# Patient Record
Sex: MALE | Race: WHITE | Employment: FULL TIME | URBAN - METROPOLITAN AREA
[De-identification: names, ages, dates, MRNs, and addresses within clinical notes are randomized per-mention and may not be internally consistent; named-entity substitution may affect disease eponyms.]

---

## 2023-06-05 ENCOUNTER — OFFICE VISIT (OUTPATIENT)
Dept: UROLOGY | Age: 58
End: 2023-06-05
Payer: OTHER GOVERNMENT

## 2023-06-05 DIAGNOSIS — E29.1 HYPOGONADISM IN MALE: ICD-10-CM

## 2023-06-05 DIAGNOSIS — N52.01 ERECTILE DYSFUNCTION DUE TO ARTERIAL INSUFFICIENCY: Primary | ICD-10-CM

## 2023-06-05 PROCEDURE — 99214 OFFICE O/P EST MOD 30 MIN: CPT | Performed by: UROLOGY

## 2023-06-05 RX ORDER — LISINOPRIL 20 MG/1
20 TABLET ORAL DAILY
COMMUNITY

## 2023-06-05 RX ORDER — SIMVASTATIN 20 MG
20 TABLET ORAL NIGHTLY
COMMUNITY

## 2023-06-05 RX ORDER — LEVOTHYROXINE SODIUM 0.2 MG/1
200 TABLET ORAL DAILY
COMMUNITY

## 2023-06-05 RX ORDER — CHLORAL HYDRATE 500 MG
CAPSULE ORAL DAILY
COMMUNITY

## 2023-06-05 RX ORDER — INSULIN GLARGINE 100 [IU]/ML
INJECTION, SOLUTION SUBCUTANEOUS
COMMUNITY

## 2023-06-05 RX ORDER — INSULIN ASPART 100 [IU]/ML
INJECTION, SOLUTION INTRAVENOUS; SUBCUTANEOUS
COMMUNITY

## 2023-06-05 ASSESSMENT — ENCOUNTER SYMPTOMS
SKIN LESIONS: 0
CONSTIPATION: 0
INDIGESTION: 0
BACK PAIN: 0
HEARTBURN: 0
BLOOD IN STOOL: 0
EYE DISCHARGE: 0
VOMITING: 0
WHEEZING: 0
SHORTNESS OF BREATH: 0
ABDOMINAL PAIN: 0
DIARRHEA: 0
EYE PAIN: 0
COUGH: 0
NAUSEA: 0

## 2023-06-05 NOTE — PROGRESS NOTES
[facility]  [address] [facility] [address], [address] [facility]  [address] [place] [address]  : 1965    Chief Complaint   Patient presents with    Follow-up          HPI     Shea Giordano is a 66 y.o. male with a PMH of ED (failed viagra / cialis / MARGARET) who returns for follow up today for low T concerns. Seen 2022 by me and tried cialis + MARGARET for ED. States he has had only mild success. Main concern today is weight gain, fatigue, low libido and concern for low T. Has not had recent T levels checked. Wants to discuss TRT today. PCP screens for CAP at [address] and normal per patient. Past Medical History:   Diagnosis Date    Diabetes ([address]     Hypercholesterolemia     Hypertension     Thyroid disease      History reviewed. No pertinent surgical history. Current Outpatient Medications   Medication Sig Dispense Refill    insulin aspart (NOVOLOG) 100 UNIT/ML injection vial by Pump fill route      insulin glargine (LANTUS) 100 UNIT/ML injection vial Inject into the skin      metFORMIN (GLUCOPHAGE) 1000 MG tablet Take by mouth 2 times daily (with meals)      lisinopril (PRINIVIL;ZESTRIL) 20 MG tablet Take 1 tablet by mouth daily      simvastatin (ZOCOR) 20 MG tablet Take 1 tablet by mouth nightly      Omega-3 Fatty Acids (FISH OIL) 1000 MG capsule Take by mouth daily      levothyroxine (SYNTHROID) 200 MCG tablet Take 1 tablet by mouth Daily      Cetirizine HCl 10 MG CAPS Take by mouth       No current facility-administered medications for this visit.      No Known Allergies  Social History     Socioeconomic History    Marital status:      Spouse name: Not on file    Number of children: Not on file    Years of education: Not on file    Highest education level: Not on file   Occupational History    Not on file   Tobacco Use    Smoking status: Former    Smokeless tobacco: Former   Substance and Sexual Activity    Alcohol use: Never    Drug use: Not on file    Sexual activity: Not on

## 2023-06-11 DIAGNOSIS — E29.1 HYPOGONADISM IN MALE: Primary | ICD-10-CM

## 2023-06-11 DIAGNOSIS — N40.0 BENIGN PROSTATIC HYPERPLASIA WITHOUT LOWER URINARY TRACT SYMPTOMS: ICD-10-CM

## 2023-06-21 ENCOUNTER — NURSE ONLY (OUTPATIENT)
Dept: UROLOGY | Age: 58
End: 2023-06-21

## 2023-06-21 DIAGNOSIS — E29.1 HYPOGONADISM IN MALE: ICD-10-CM

## 2023-06-21 DIAGNOSIS — N40.0 BENIGN PROSTATIC HYPERPLASIA WITHOUT LOWER URINARY TRACT SYMPTOMS: ICD-10-CM

## 2023-06-21 LAB
ALBUMIN SERPL-MCNC: 3.7 G/DL (ref 3.5–5)
ALBUMIN/GLOB SERPL: 1.2 (ref 0.4–1.6)
ALP SERPL-CCNC: 74 U/L (ref 50–136)
ALT SERPL-CCNC: 28 U/L (ref 12–65)
AST SERPL-CCNC: 12 U/L (ref 15–37)
BILIRUB DIRECT SERPL-MCNC: 0.2 MG/DL
BILIRUB SERPL-MCNC: 0.5 MG/DL (ref 0.2–1.1)
ERYTHROCYTE [DISTWIDTH] IN BLOOD BY AUTOMATED COUNT: 12.4 % (ref 11.9–14.6)
GLOBULIN SER CALC-MCNC: 3 G/DL (ref 2.8–4.5)
HCT VFR BLD AUTO: 42 % (ref 41.1–50.3)
HGB BLD-MCNC: 14.6 G/DL (ref 13.6–17.2)
MCH RBC QN AUTO: 31.9 PG (ref 26.1–32.9)
MCHC RBC AUTO-ENTMCNC: 34.8 G/DL (ref 31.4–35)
MCV RBC AUTO: 91.9 FL (ref 82–102)
NRBC # BLD: 0 K/UL (ref 0–0.2)
PLATELET # BLD AUTO: 281 K/UL (ref 150–450)
PMV BLD AUTO: 9.1 FL (ref 9.4–12.3)
PROT SERPL-MCNC: 6.7 G/DL (ref 6.3–8.2)
PSA SERPL-MCNC: 0.2 NG/ML
RBC # BLD AUTO: 4.57 M/UL (ref 4.23–5.6)
WBC # BLD AUTO: 8.3 K/UL (ref 4.3–11.1)

## 2023-06-24 LAB — TESTOST SERPL-MCNC: 290 NG/DL (ref 264–916)

## 2023-06-26 LAB — TESTOST FREE SERPL-MCNC: 3.5 PG/ML (ref 7.2–24)

## 2023-06-27 DIAGNOSIS — E29.1 HYPOGONADISM IN MALE: Primary | ICD-10-CM

## 2023-06-27 RX ORDER — TESTOSTERONE 16.2 MG/G
2 GEL TRANSDERMAL DAILY
Qty: 1 EACH | Refills: 5 | Status: SHIPPED | OUTPATIENT
Start: 2023-06-27 | End: 2024-06-26

## 2023-09-29 ENCOUNTER — NURSE ONLY (OUTPATIENT)
Dept: UROLOGY | Age: 58
End: 2023-09-29

## 2023-09-29 DIAGNOSIS — E29.1 HYPOGONADISM IN MALE: ICD-10-CM

## 2023-09-29 LAB
ALBUMIN SERPL-MCNC: 3.5 G/DL (ref 3.5–5)
ALBUMIN/GLOB SERPL: 1.1 (ref 0.4–1.6)
ALP SERPL-CCNC: 88 U/L (ref 50–136)
ALT SERPL-CCNC: 26 U/L (ref 12–65)
AST SERPL-CCNC: 9 U/L (ref 15–37)
BILIRUB DIRECT SERPL-MCNC: 0.1 MG/DL
BILIRUB SERPL-MCNC: 0.5 MG/DL (ref 0.2–1.1)
ERYTHROCYTE [DISTWIDTH] IN BLOOD BY AUTOMATED COUNT: 12.1 % (ref 11.9–14.6)
GLOBULIN SER CALC-MCNC: 3.1 G/DL (ref 2.8–4.5)
HCT VFR BLD AUTO: 41.1 % (ref 41.1–50.3)
HGB BLD-MCNC: 14.4 G/DL (ref 13.6–17.2)
MCH RBC QN AUTO: 31.7 PG (ref 26.1–32.9)
MCHC RBC AUTO-ENTMCNC: 35 G/DL (ref 31.4–35)
MCV RBC AUTO: 90.5 FL (ref 82–102)
NRBC # BLD: 0 K/UL (ref 0–0.2)
PLATELET # BLD AUTO: 257 K/UL (ref 150–450)
PMV BLD AUTO: 9 FL (ref 9.4–12.3)
PROT SERPL-MCNC: 6.6 G/DL (ref 6.3–8.2)
PSA SERPL-MCNC: 0.2 NG/ML
RBC # BLD AUTO: 4.54 M/UL (ref 4.23–5.6)
WBC # BLD AUTO: 8.7 K/UL (ref 4.3–11.1)

## 2023-10-01 LAB — TESTOST SERPL-MCNC: 203 NG/DL (ref 264–916)

## 2023-10-02 NOTE — RESULT ENCOUNTER NOTE
Will review at upcoming appointment. Testosterone level still low. Loy Ramey M.D.     [facility]  [facility], [address] [place] [address]  Phone: [phone]  Fax: [phone]

## 2023-10-06 ENCOUNTER — OFFICE VISIT (OUTPATIENT)
Dept: UROLOGY | Age: 58
End: 2023-10-06
Payer: COMMERCIAL

## 2023-10-06 DIAGNOSIS — E29.1 HYPOGONADISM IN MALE: Primary | ICD-10-CM

## 2023-10-06 DIAGNOSIS — N52.01 ERECTILE DYSFUNCTION DUE TO ARTERIAL INSUFFICIENCY: ICD-10-CM

## 2023-10-06 LAB — TESTOST FREE SERPL-MCNC: 3.9 PG/ML (ref 7.2–24)

## 2023-10-06 PROCEDURE — 99214 OFFICE O/P EST MOD 30 MIN: CPT | Performed by: UROLOGY

## 2023-10-06 ASSESSMENT — ENCOUNTER SYMPTOMS
CONSTIPATION: 0
BLOOD IN STOOL: 0
ABDOMINAL PAIN: 0
INDIGESTION: 0
SHORTNESS OF BREATH: 0
NAUSEA: 0
BACK PAIN: 0
VOMITING: 0
WHEEZING: 0
EYE DISCHARGE: 0
EYE PAIN: 0
DIARRHEA: 0
HEARTBURN: 0
COUGH: 0
SKIN LESIONS: 0

## 2023-10-06 NOTE — PROGRESS NOTES
[facility]  [address]  : 1965    Chief Complaint   Patient presents with    Follow-up          HPI     Joes Blackwell is a 66 y.o. male with a PMH of ED (failed viagra / cialis / MARGARET) and hypogonadism who returns for follow up. Seen 2023 by me and opted to start androgel 2 pumps daily. Today, he reports NO improvement in low T symptoms and has gained weight, fatigue and low libido. Repeat T lab after 3 months of gel shows T level 203. He reports never missing a dose of the medication and wants to discuss other options today. He also has ED and tried cialis + MARGARET for ED in the past. States he has had only mild success. Does not want further treatment for this at the current time. PCP screens for CAP at [place] and normal per patient. Past Medical History:   Diagnosis Date    Diabetes ([address])     Hypercholesterolemia     Hypertension     Thyroid disease      History reviewed. No pertinent surgical history. Current Outpatient Medications   Medication Sig Dispense Refill    Testosterone Cypionate 200 MG/ML KIT Inject 1 mL into the muscle every 10 days. Max Daily Amount: 1 mL 9 kit 5    Testosterone (ANDROGEL) 20.25 MG/ACT (1.62%) GEL gel Place 2 actuation onto the skin daily.  Max Daily Amount: 40.5 mg 1 each 5    insulin glargine (LANTUS) 100 UNIT/ML injection vial Inject into the skin      metFORMIN (GLUCOPHAGE) 1000 MG tablet Take by mouth 2 times daily (with meals)      lisinopril (PRINIVIL;ZESTRIL) 20 MG tablet Take 1 tablet by mouth daily      simvastatin (ZOCOR) 20 MG tablet Take 1 tablet by mouth nightly      Omega-3 Fatty Acids (FISH OIL) 1000 MG capsule Take by mouth daily      levothyroxine (SYNTHROID) 200 MCG tablet Take 1 tablet by mouth Daily      Cetirizine HCl 10 MG CAPS Take by mouth      insulin aspart (NOVOLOG) 100 UNIT/ML injection vial by Pump fill route       No current

## 2023-10-13 ENCOUNTER — NURSE ONLY (OUTPATIENT)
Dept: UROLOGY | Age: 58
End: 2023-10-13

## 2023-10-13 DIAGNOSIS — E29.1 HYPOGONADISM IN MALE: Primary | ICD-10-CM

## 2023-10-13 RX ORDER — TESTOSTERONE CYPIONATE 200 MG/ML
200 INJECTION, SOLUTION INTRAMUSCULAR ONCE
Status: COMPLETED | OUTPATIENT
Start: 2023-10-13 | End: 2023-10-13

## 2023-10-13 RX ADMIN — TESTOSTERONE CYPIONATE 200 MG: 200 INJECTION, SOLUTION INTRAMUSCULAR at 11:47

## 2023-10-13 NOTE — PROGRESS NOTES
Patient came to office today to learn how to self inject testosterone Patient was shown a proper technique to inject into vastus lateralis muscle. They were instructed to alternate thighs every injection, and instructed to give 1mL of testosterone every 10 days per orders of . Patient did perform injection himself in R vastus lateralis successfully today in office.

## 2024-01-09 ENCOUNTER — NURSE ONLY (OUTPATIENT)
Dept: UROLOGY | Age: 59
End: 2024-01-09

## 2024-01-09 DIAGNOSIS — E29.1 HYPOGONADISM IN MALE: ICD-10-CM

## 2024-01-09 LAB
ERYTHROCYTE [DISTWIDTH] IN BLOOD BY AUTOMATED COUNT: 13.1 % (ref 11.9–14.6)
HCT VFR BLD AUTO: 49 % (ref 41.1–50.3)
HGB BLD-MCNC: 16.8 G/DL (ref 13.6–17.2)
MCH RBC QN AUTO: 31.8 PG (ref 26.1–32.9)
MCHC RBC AUTO-ENTMCNC: 34.3 G/DL (ref 31.4–35)
MCV RBC AUTO: 92.8 FL (ref 82–102)
NRBC # BLD: 0 K/UL (ref 0–0.2)
PLATELET # BLD AUTO: 273 K/UL (ref 150–450)
PMV BLD AUTO: 9.1 FL (ref 9.4–12.3)
RBC # BLD AUTO: 5.28 M/UL (ref 4.23–5.6)
WBC # BLD AUTO: 9.2 K/UL (ref 4.3–11.1)

## 2024-01-10 LAB — TESTOST SERPL-MCNC: 969 NG/DL (ref 264–916)

## 2024-01-11 DIAGNOSIS — N40.1 BENIGN PROSTATIC HYPERPLASIA WITH URINARY FREQUENCY: ICD-10-CM

## 2024-01-11 DIAGNOSIS — E29.1 HYPOGONADISM IN MALE: Primary | ICD-10-CM

## 2024-01-11 DIAGNOSIS — R35.0 BENIGN PROSTATIC HYPERPLASIA WITH URINARY FREQUENCY: ICD-10-CM

## 2024-01-11 NOTE — RESULT ENCOUNTER NOTE
Mr. Bearden,     Your testosterone labs look good.  I would continue your current dose and would like to see you for follow up in 6 months with repeat labs prior to appointment.  My medical assistant will contact you to set this up.    Best Regards,  Dr. Watson

## 2024-01-17 LAB — TESTOST FREE SERPL-MCNC: 20.4 PG/ML (ref 7.2–24)

## 2024-02-05 ENCOUNTER — TELEPHONE (OUTPATIENT)
Dept: UROLOGY | Age: 59
End: 2024-02-05

## 2024-02-05 NOTE — TELEPHONE ENCOUNTER
RFS and OV [facility] 12[facility]. A follow up request was emailed to [facility] on 01/22/2024. Got through to [facility] today for an update on RFS/OV sent. [facility] requesting me to ref[facility] this RFS/OV stating that they did not receive it.  [facility]axing[facility]ing to [facility] today.

## 2024-02-08 ENCOUNTER — TELEPHONE (OUTPATIENT)
Dept: UROLOGY | Age: 59
End: 2024-02-08

## 2024-02-08 DIAGNOSIS — N52.01 ERECTILE DYSFUNCTION DUE TO ARTERIAL INSUFFICIENCY: Primary | ICD-10-CM

## 2024-02-08 NOTE — TELEPHONE ENCOUNTER
Patient called in requesting trimix teaching appointment.     Trimix script sent today.  Will arrange teaching appointment.     Loy Watson M.D.    [facility]  [address] [address]  Phone: [phone]  Fax: [phone]

## 2024-02-20 DIAGNOSIS — E29.1 HYPOGONADISM IN MALE: ICD-10-CM

## 2024-02-25 NOTE — PROGRESS NOTES
[facility]  [address]   [address]  [place] [address]  [phone]    Danny Bearden  : 1965    CC: Erectile Dysfunction.      HPI     Danny Bearden is a 58 y.o. male with a PMH of ED (failed viagra / cialis / MARGARET) and hypogonadism who returns for trimix teaching.     Seen 10/2023 by me and was started on  mg IM q10 days as androgel 2 pumps daily did not improve T levels.  Recheck T labs 2024 were WNL.  He has repeat T levels due 2024 and is doing well on these injections.       He also has ED and tried cialis + MARGARET for ED in the past. States he has had only mild success. Wants to proceed with trimix teaching today.      [facility] and normal per patient.     Lab Results   Component Value Date    PSA 0.2 2023    PSA 0.2 2023     Past Medical History:   Diagnosis Date    Diabetes (H[facility])     Hypercholesterolemia     Hypertension     Thyroid disease      History reviewed. No pertinent surgical history.  Current Outpatient Medications   Medication Sig Dispense Refill    Testosterone Cypionate 200 MG/ML KIT Inject 1 mL into the muscle every 10 days. Max Daily Amount: 1 mL 9 kit 5    Papav-Phentolamine-Alprostadil (SUPER TRI-MIX) 150- MG-MG-MCG SOLR 1 each by IntraCAVernosal route as needed (erectile dysfunction) PGE1: 10 mcg/ml; Papaverine: 30 mg/ml, Phentolamine: 1 mg/ml;  + all other supplies as needed. 1 each 5    Testosterone (ANDROGEL) 20.25 MG/ACT (1.62%) GEL gel Place 2 actuation onto the skin daily. Max Daily Amount: 40.5 mg 1 each 5    insulin glargine (LANTUS) 100 UNIT/ML injection vial Inject into the skin      lisinopril (PRINIVIL;ZESTRIL) 20 MG tablet Take 1 tablet by mouth daily      simvastatin (ZOCOR) 20 MG tablet Take 1 tablet by mouth nightly      Omega-3 Fatty Acids (FISH OIL) 1000 MG capsule Take by mouth daily      levothyroxine (SYNTHROID) 200 MCG tablet Take 1 tablet by mouth Daily      Cetirizine HCl 10 MG CAPS Take by mouth

## 2024-02-26 ENCOUNTER — OFFICE VISIT (OUTPATIENT)
Dept: UROLOGY | Age: 59
End: 2024-02-26
Payer: OTHER GOVERNMENT

## 2024-02-26 DIAGNOSIS — N52.01 ERECTILE DYSFUNCTION DUE TO ARTERIAL INSUFFICIENCY: ICD-10-CM

## 2024-02-26 DIAGNOSIS — E29.1 HYPOGONADISM IN MALE: Primary | ICD-10-CM

## 2024-02-26 PROCEDURE — 54235 NJX CORPORA CAVERNOSA RX AGT: CPT | Performed by: UROLOGY

## 2024-05-29 ENCOUNTER — OFFICE VISIT (OUTPATIENT)
Dept: UROLOGY | Age: 59
End: 2024-05-29
Payer: OTHER GOVERNMENT

## 2024-05-29 DIAGNOSIS — E29.1 HYPOGONADISM IN MALE: ICD-10-CM

## 2024-05-29 DIAGNOSIS — N52.01 ERECTILE DYSFUNCTION DUE TO ARTERIAL INSUFFICIENCY: Primary | ICD-10-CM

## 2024-05-29 PROCEDURE — 99213 OFFICE O/P EST LOW 20 MIN: CPT | Performed by: UROLOGY

## 2024-05-29 RX ORDER — LISINOPRIL AND HYDROCHLOROTHIAZIDE 12.5; 1 MG/1; MG/1
TABLET ORAL
COMMUNITY
Start: 2024-05-21

## 2024-05-29 ASSESSMENT — ENCOUNTER SYMPTOMS
BACK PAIN: 0
NAUSEA: 0

## 2024-05-29 NOTE — PROGRESS NOTES
Will continue  mg IM q10 days.  Doing well.  Keep f/u with me as scheduled 7/2024 with T labs prior     ED:   Trimix single strength working well.  Does not need refills today.  Will continue current dose of 0.28 ml      Will return for T labs in July and will call with results when available.    I have spent 20 minutes today reviewing previous notes, test results and face to face with the patient as well as documenting.      Loy Watson M.D.    [facility] [place] [address]  Phone: [phone]  Fax: [phone]

## 2024-07-16 ENCOUNTER — LAB (OUTPATIENT)
Dept: UROLOGY | Age: 59
End: 2024-07-16

## 2024-07-16 DIAGNOSIS — R35.0 BENIGN PROSTATIC HYPERPLASIA WITH URINARY FREQUENCY: ICD-10-CM

## 2024-07-16 DIAGNOSIS — E29.1 HYPOGONADISM IN MALE: ICD-10-CM

## 2024-07-16 DIAGNOSIS — N40.1 BENIGN PROSTATIC HYPERPLASIA WITH URINARY FREQUENCY: ICD-10-CM

## 2024-07-16 LAB
ALBUMIN SERPL-MCNC: 3.9 G/DL (ref 3.5–5)
ALBUMIN/GLOB SERPL: 1.4 (ref 1–1.9)
ALP SERPL-CCNC: 68 U/L (ref 40–129)
ALT SERPL-CCNC: 24 U/L (ref 12–65)
AST SERPL-CCNC: 29 U/L (ref 15–37)
BILIRUB DIRECT SERPL-MCNC: 0.2 MG/DL (ref 0–0.4)
BILIRUB SERPL-MCNC: 0.7 MG/DL (ref 0–1.2)
ERYTHROCYTE [DISTWIDTH] IN BLOOD BY AUTOMATED COUNT: 12.7 % (ref 11.9–14.6)
GLOBULIN SER CALC-MCNC: 2.9 G/DL (ref 2.3–3.5)
HCT VFR BLD AUTO: 51.6 % (ref 41.1–50.3)
HGB BLD-MCNC: 18.4 G/DL (ref 13.6–17.2)
MCH RBC QN AUTO: 32.1 PG (ref 26.1–32.9)
MCHC RBC AUTO-ENTMCNC: 35.7 G/DL (ref 31.4–35)
MCV RBC AUTO: 89.9 FL (ref 82–102)
NRBC # BLD: 0 K/UL (ref 0–0.2)
PLATELET # BLD AUTO: 266 K/UL (ref 150–450)
PMV BLD AUTO: 9.3 FL (ref 9.4–12.3)
PROT SERPL-MCNC: 6.7 G/DL (ref 6.3–8.2)
PSA SERPL-MCNC: 0.3 NG/ML (ref 0–4)
RBC # BLD AUTO: 5.74 M/UL (ref 4.23–5.6)
WBC # BLD AUTO: 11.1 K/UL (ref 4.3–11.1)

## 2024-07-18 LAB
TESTOST FREE SERPL-MCNC: 18.3 PG/ML (ref 7.2–24)
TESTOST SERPL-MCNC: 988 NG/DL (ref 264–916)

## 2024-09-30 DIAGNOSIS — E29.1 HYPOGONADISM IN MALE: ICD-10-CM

## 2024-12-02 ENCOUNTER — OFFICE VISIT (OUTPATIENT)
Dept: UROLOGY | Age: 59
End: 2024-12-02

## 2024-12-02 DIAGNOSIS — N52.01 ERECTILE DYSFUNCTION DUE TO ARTERIAL INSUFFICIENCY: ICD-10-CM

## 2024-12-02 DIAGNOSIS — E29.1 HYPOGONADISM IN MALE: ICD-10-CM

## 2024-12-02 DIAGNOSIS — N40.1 BENIGN PROSTATIC HYPERPLASIA WITH URINARY FREQUENCY: Primary | ICD-10-CM

## 2024-12-02 DIAGNOSIS — R35.0 BENIGN PROSTATIC HYPERPLASIA WITH URINARY FREQUENCY: Primary | ICD-10-CM

## 2024-12-05 ASSESSMENT — ENCOUNTER SYMPTOMS
CONSTIPATION: 0
ABDOMINAL PAIN: 0
SKIN LESIONS: 0
WHEEZING: 0
BLOOD IN STOOL: 0
NAUSEA: 0
EYE PAIN: 0
HEARTBURN: 0
SHORTNESS OF BREATH: 0
COUGH: 0
VOMITING: 0
BACK PAIN: 0
INDIGESTION: 0
DIARRHEA: 0
EYE DISCHARGE: 0

## 2024-12-05 NOTE — PROGRESS NOTES
MCG tablet Take 1 tablet by mouth Daily      Cetirizine HCl 10 MG CAPS Take by mouth       No current facility-administered medications for this visit.       No Known Allergies    Social History     Socioeconomic History    Marital status:      Spouse name: Not on file    Number of children: Not on file    Years of education: Not on file    Highest education level: Not on file   Occupational History    Not on file   Tobacco Use    Smoking status: Former    Smokeless tobacco: Former   Substance and Sexual Activity    Alcohol use: Never    Drug use: Not on file    Sexual activity: Not on file   Other Topics Concern    Not on file   Social History Narrative    Not on file     Social Determinants of Health     Financial Resource Strain: Not on file   Food Insecurity: Not on file   Transportation Needs: Not on file   Physical Activity: Not on file (8/17/2021)   Stress: Not on file   Social Connections: Unknown (3/19/2021)    Received from [facility]    Social Connections     Frequency of Communication with Friends and Family: Not asked     Frequency of Social Gatherings with Friends and Family: Not asked   Intimate Partner Violence: Unknown (3/19/2021)    Received from [facility]    Intimate Partner Violence     Fear of Current or Ex-Partner: Not asked     Emotionally Abused: Not asked     Physically Abused: Not asked     Sexually Abused: Not asked   Housing Stability: Not At Risk (3/9/2022)    Received from [facility]    Housing Stability     Was there a time when you did not have a steady place to sleep: Not asked     Worried that the place you are staying is making you sick: Not asked       Family History   Problem Relation Age of Onset    Cancer Mother     Hypertension Mother     Hypertension Father     Diabetes Father     Thyroid Disease Mother        Review of Systems  Constitutional:   Negative for fever, chills, appetite change, malaise/fatigue,

## 2024-12-17 DIAGNOSIS — E29.1 HYPOGONADISM IN MALE: ICD-10-CM

## 2025-01-17 ENCOUNTER — TELEPHONE (OUTPATIENT)
Dept: UROLOGY | Age: 60
End: 2025-01-17

## 2025-01-17 NOTE — TELEPHONE ENCOUNTER
Prior Authorization for testosterone cyp initiated on 01\17\2025 on [employer]mymeds  Per the website:

## 2025-01-17 NOTE — TELEPHONE ENCOUNTER
Prior Auth approved from 1/17/2025 through 1/17/2028  Contacted pt via phone number on file, let pt know of approval.   Per the website:

## 2025-07-18 DIAGNOSIS — E29.1 HYPOGONADISM IN MALE: ICD-10-CM

## 2025-07-24 DIAGNOSIS — E29.1 HYPOGONADISM IN MALE: ICD-10-CM
